# Patient Record
Sex: MALE | Race: WHITE | NOT HISPANIC OR LATINO
[De-identification: names, ages, dates, MRNs, and addresses within clinical notes are randomized per-mention and may not be internally consistent; named-entity substitution may affect disease eponyms.]

---

## 2017-01-19 ENCOUNTER — RECORDS - HEALTHEAST (OUTPATIENT)
Dept: ADMINISTRATIVE | Facility: OTHER | Age: 67
End: 2017-01-19

## 2017-01-19 ENCOUNTER — OFFICE VISIT - HEALTHEAST (OUTPATIENT)
Dept: PODIATRY | Facility: CLINIC | Age: 67
End: 2017-01-19

## 2017-01-19 DIAGNOSIS — G57.60 PLANTAR NERVE LESION, UNSPECIFIED LATERALITY: ICD-10-CM

## 2017-01-19 DIAGNOSIS — M72.2 PLANTAR FASCIITIS: ICD-10-CM

## 2017-03-06 ENCOUNTER — RECORDS - HEALTHEAST (OUTPATIENT)
Dept: ADMINISTRATIVE | Facility: OTHER | Age: 67
End: 2017-03-06

## 2021-06-08 NOTE — PROGRESS NOTES
Admission History & Physical  Rock Yoo Jr., 1950, [id number]    [facility]  Ruthie Giordano MD, [phone]    Extended Emergency Contact Information  Primary Emergency Contact: Mariia Yoo  Address: [address] [place] [address] [place]  Home Phone: [phone]  Mobile Phone: [phone]  Relation: Spouse     Assessment and Plan:   Assessment: Plantar fasciitis right foot, neuroma second intermetatarsal space left foot  Plan: The patient was given a cortisone injection in the right heel today as well as the second intermetatarsal space of the left foot.  Orthotics also recommended.  Active Problems:    * No active hospital problems. *      Chief Complaint:  severe pain right heel times several months and pain between the second and third toes left foot.       HPI:    Rock Yoo Jr. is a 66 y.o. old male who presented to the clinic today complaining of severe pain on the bottom of his right heel in between the second and third toes left foot.  These pains are both aggravated with weightbearing and ablation.  The pain is mildly relieved with nonweightbearing.  The right heel is severely painful when he first gets out of bed in the mornings.  He has not noticed any associated redness or swelling.  The pain is relieved with nonweightbearing.  He denies any other previous treatment.   History is provided by patient    Medical History  Active Ambulatory (Non-Hospital) Problems    Diagnosis     Alcohol abuse, in remission     Hearing loss     Urinary Frequency     Past Medical History   Diagnosis Date     Pneumonia 1967     Patient Active Problem List    Diagnosis Date Noted     Alcohol abuse, in remission 12/06/2016     Hearing loss 12/06/2016     Urinary Frequency      Surgical History  He  has a past surgical history that includes Stapedectomy (2005).   Past Surgical History   Procedure Laterality Date     Stapedectomy  2005    Social  History  Reviewed, and he  reports that he quit smoking about 32 years ago. His smoking use included Cigarettes. He has quit using smokeless tobacco. His smokeless tobacco use included Chew. He reports that he does not drink alcohol or use illicit drugs.  Social History   Substance Use Topics     Smoking status: Former Smoker     Types: Cigarettes     Quit date: 1985     Smokeless tobacco: Former User     Types: Chew     Alcohol use No      Comment: former      Allergies  Allergies   Allergen Reactions     Squid Anaphylaxis     Milk      Annotation: Cow's milk      Family History  Reviewed, and family history includes Arthritis in his mother; Diabetes in his father; Heart attack in his father; Hypertension in his father; Other in his brother and mother.   Psychosocial Needs  Social History     Social History Narrative     Additional psychosocial needs reviewed per nursing assessment.       Prior to Admission Medications     (Not in a hospital admission)        Review of Systems - Negative      Visit Vitals     Pulse (!) 59     Resp 16     SpO2 98%     Objective findings: Gen.: The patient is alert and in no acute distress      Integument: Nails bilateral feet normal length and color.  Skin bilateral feet warm supple and intact.      Vascular: DP and PT pulses +2 over 4 bilateral feet. Capillary refill less than 2 seconds bilateral feet.      Neurologic: Negative clonus, negative Babinski bilateral feet.  Negative Micheline sign second intermetatarsal  space left foot.      Musculoskeletal: Range of motion within normal limits bilateral feet. Muscle power +5 over 5 bilaterally in all compartments.    There is severe pain on palpation of the plantar medial aspect of right heel.  There is no edema or erythema surrounding the right heel.  No palpable masses noted.  There is severe pain on palpation of the second intermetatarsal space of the left foot.      Assessment: Plantar fasciitis right foot, neuroma second  intermetatarsal space left foot      Plan: The patient was given a cortisone injection in the right heel today consisting of 2 mL of dexamethasone sodium phosphate and 1 mL of 2% lidocaine plain and he was also given a cortisone injection at the second intermetatarsal space left foot consisting of 1/2 mL of dexamethasone sodium phosphate and 1/2 mL of 2% lidocaine plain.  The patient was placed on ibuprofen 600 mg 1 tab 3 times a day.  I also recommended orthotics.

## 2024-12-17 ENCOUNTER — TRANSCRIBE ORDERS (OUTPATIENT)
Dept: OTHER | Age: 74
End: 2024-12-17

## 2024-12-17 DIAGNOSIS — M54.2 NECK PAIN: Primary | ICD-10-CM
